# Patient Record
Sex: FEMALE | Race: WHITE | NOT HISPANIC OR LATINO | Employment: PART TIME | URBAN - METROPOLITAN AREA
[De-identification: names, ages, dates, MRNs, and addresses within clinical notes are randomized per-mention and may not be internally consistent; named-entity substitution may affect disease eponyms.]

---

## 2020-08-04 ENCOUNTER — TRANSFERRED RECORDS (OUTPATIENT)
Dept: HEALTH INFORMATION MANAGEMENT | Facility: CLINIC | Age: 65
End: 2020-08-04

## 2020-08-27 ENCOUNTER — HOSPITAL ENCOUNTER (OUTPATIENT)
Dept: MRI IMAGING | Facility: CLINIC | Age: 65
Discharge: HOME | End: 2020-08-27
Attending: PHYSICIAN ASSISTANT | Admitting: PHYSICIAN ASSISTANT
Payer: COMMERCIAL

## 2020-08-27 DIAGNOSIS — M47.812 CERVICAL SPONDYLOSIS WITHOUT MYELOPATHY: ICD-10-CM

## 2020-08-27 PROCEDURE — 72141 MRI NECK SPINE W/O DYE: CPT

## 2020-10-29 ENCOUNTER — TELEPHONE (OUTPATIENT)
Dept: NEUROSURGERY | Facility: CLINIC | Age: 65
End: 2020-10-29

## 2020-10-29 NOTE — TELEPHONE ENCOUNTER
Left  for pt to return call. Referral scanned into chart.    Appt note:    Ref by Oksana, to evaluate and treat neck pain. MRI w/[facility]. prev neck surg w/[facility], No recent INJ.

## 2020-12-03 ENCOUNTER — OFFICE VISIT (OUTPATIENT)
Dept: NEUROSURGERY | Facility: CLINIC | Age: 65
End: 2020-12-03
Payer: COMMERCIAL

## 2020-12-03 VITALS
DIASTOLIC BLOOD PRESSURE: 72 MMHG | BODY MASS INDEX: 27.11 KG/M2 | WEIGHT: 153 LBS | HEART RATE: 93 BPM | TEMPERATURE: 97.9 F | OXYGEN SATURATION: 98 % | HEIGHT: 63 IN | SYSTOLIC BLOOD PRESSURE: 132 MMHG

## 2020-12-03 DIAGNOSIS — M48.02 SPINAL STENOSIS IN CERVICAL REGION: Primary | ICD-10-CM

## 2020-12-03 PROCEDURE — 99203 OFFICE O/P NEW LOW 30 MIN: CPT | Performed by: PHYSICIAN ASSISTANT

## 2020-12-03 RX ORDER — HYDROCODONE BITARTRATE AND ACETAMINOPHEN 5; 325 MG/1; MG/1
1 TABLET ORAL EVERY 6 HOURS PRN
COMMUNITY

## 2020-12-03 RX ORDER — FENTANYL 12.5 UG/1
1 PATCH TRANSDERMAL
COMMUNITY

## 2020-12-03 RX ORDER — BUPROPION HYDROCHLORIDE 100 MG/1
100 TABLET, EXTENDED RELEASE ORAL 2 TIMES DAILY
COMMUNITY

## 2020-12-03 RX ORDER — CYCLOBENZAPRINE HCL 5 MG
5 TABLET ORAL 3 TIMES DAILY PRN
COMMUNITY

## 2020-12-03 RX ORDER — CELECOXIB 50 MG/1
50 CAPSULE ORAL 2 TIMES DAILY
COMMUNITY

## 2020-12-03 ASSESSMENT — MIFFLIN-ST. JEOR: SCORE: 1195.19

## 2020-12-03 ASSESSMENT — PAIN SCALES - GENERAL: PAINLEVEL: MODERATE PAIN (5)

## 2020-12-03 NOTE — PROGRESS NOTES
Dr. Toby Perez  [facility]  Neurosurgery Clinic Visit      CC: neck pain    Primary care Provider: No Ref-Primary, Physician    Referring Provider:  Skip Adamson PA-C      Reason For Visit:   I was asked to consult on the patient for neck pain.      HPI: Brittnee Salazar is a 64 year old female who presents for evaluation of her chief complaint of neck pain, gradually worsening over the years.  She had a cervical fusion done at C4-5 about 10 years ago.  This was done with Dr. Clark in [place].  She has been following with [facility], managed on oral pain medications over the last couple of years.  She then was sent for a cervical spine MRI, which did show moderate to severe central stenosis at C3-4, the level superior to her prior fusion.  She was then referred to our office.  She does state that her neck pain seems to be fairly mild.  She is not describing any radicular type arm pain or paresthesias.  She does note that she has dropped things over the last few months, but has not noticed any particular weakness or discoordination in her upper extremities.  She is not showing any hyperreflexia or Nya sign on exam.  She denies any bowel or bladder changes, or problems with balance or coordination.      Past Medical History reviewed with patient during visit.    Past Surgical History reviewed with patient during visit.    Current Outpatient Medications   Medication     buPROPion (WELLBUTRIN SR) 100 MG 12 hr tablet     celecoxib (CELEBREX) 50 MG capsule     cyclobenzaprine (FLEXERIL) 5 MG tablet     fentaNYL (DURAGESIC) 12 mcg/hr 72 hr patch     HYDROcodone-acetaminophen (NORCO) 5-325 MG tablet     No current facility-administered medications for this visit.        No Known Allergies    Social History     Socioeconomic History     Marital status:      Spouse name: None     Number of children: None     Years of education: None     Highest education level: None   Occupational  "History     None   Social Needs     Financial resource strain: None     Food insecurity     Worry: None     Inability: None     Transportation needs     Medical: None     Non-medical: None   Tobacco Use     Smoking status: Current Every Day Smoker     Smokeless tobacco: Never Used   Substance and Sexual Activity     Alcohol use: None     Drug use: None     Sexual activity: None   Lifestyle     Physical activity     Days per week: None     Minutes per session: None     Stress: None   Relationships     Social connections     Talks on phone: None     Gets together: None     Attends Judaism service: None     Active member of club or organization: None     Attends meetings of clubs or organizations: None     Relationship status: None     Intimate partner violence     Fear of current or ex partner: None     Emotionally abused: None     Physically abused: None     Forced sexual activity: None   Other Topics Concern     None   Social History Narrative     None       No family history on file.       ROS: 10 point ROS neg other than the symptoms noted above in the HPI.    Vital Signs: /72   Pulse 93   Temp 97.9  F (36.6  C) (Temporal)   Ht 5' 2.5\" (1.588 m)   Wt 153 lb (69.4 kg)   SpO2 98%   BMI 27.54 kg/m      Examination:  Constitutional:  Alert, well nourished, NAD.  HEENT: Normocephalic, atraumatic.   Pulmonary:  Without shortness of breath, normal effort.   Lymph: no lymphadenopathy to low back or LE.   Integumentary: Skin is free of rashes or lesions.   Cardiovascular:  No pitting edema of BLE.    Psych: Normal affect, no apparent distress    Neurological:  Awake  Alert  Oriented x 3  Speech clear  Cranial nerves II - XII grossly intact  Motor exam   Shoulder Abduction:  Right:  5/5   Left:  5/5  Biceps:                      Right:  5/5   Left:  5/5  Triceps:                     Right:  5/5   Left:  5/5  Wrist Extensors:       Right:  5/5   Left:  5/5  Wrist Flexors:           Right:  5/5   Left:  " 5/5  Sensation normal to bilateral upper and lower extremities.    Reflexes are 2+ in the patellar and Achilles. There is no clonus. Downgoing Babinski.    Reflexes are 2+ in the brachial radialis and triceps. Negative Nya sign bilaterally.    Musculoskeletal:  Gait: Able to stand from a seated position. Normal non-antalgic, non-myelopathic gait.    Cervical examination reveals good range of motion.  No tenderness to palpation of the cervical spine or paraspinous muscles bilaterally.    Imaging:   MRI of the cervical spine was reviewed in the office today.   1. Postoperative change of C4-C5 anterior fusion.  2. Severe degenerative change as detailed, worst at C3-C4, C5-C6, and  C7-T1.  3. Severe spinal canal stenosis at C3-C4.  4. Severe foraminal stenosis at multiple levels.    Assessment/Plan:     Cervical stenosis C3-4  Prior C4-5 ACDF    Brittnee Salazar is a 64 year old female.  I did have a discussion with the patient regarding her symptoms and her MRI findings, which we reviewed in detail.  She understands that the level adjacent to her prior fusion has degenerated quite a bit, and has now contributed to severe central stenosis at that level.  There is not appear to be any definite cord hyperintensity signaling. Negative Metcalf, UE strength intact. No hyperreflexia noted. However, I did recommend to her that she follow-up with Dr. Perez, especially if she develops any worsening neurological symptoms, although she is not currently experiencing any.  She voiced agreement and understanding.          Homer Franco PA-C  [facility] Neurosurgery  [address] [address]    Tel [phone]  Pager [phone]

## 2020-12-03 NOTE — LETTER
"    12/3/2020         RE: Brittnee Salazar  [address] [facility] [address]        Dear Colleague,    Thank you for referring your patient, Brittnee Salazar, to the [facility] [place]. Please see a copy of my visit note below.    Brittnee Salazar is a 64 year old female who presents for:  Chief Complaint   Patient presents with     Neurologic Problem     Neck pain         Initial Vitals:  /72   Pulse 93   Temp 97.9  F (36.6  C) (Temporal)   Ht 5' 2.5\" (1.588 m)   Wt 153 lb (69.4 kg)   SpO2 98%   BMI 27.54 kg/m   Estimated body mass index is 27.54 kg/m  as calculated from the following:    Height as of this encounter: 5' 2.5\" (1.588 m).    Weight as of this encounter: 153 lb (69.4 kg).. Body surface area is 1.75 meters squared. BP completed using cuff size: regular  Moderate Pain (5)    Nursing Comments:     Supriya Perez  [facility]  Neurosurgery Clinic Visit      CC: neck pain    Primary care Provider: No Ref-Primary, Physician    Referring Provider:  Skip Adamson PA-C      Reason For Visit:   I was asked to consult on the patient for neck pain.      HPI: Brittnee Salazar is a 64 year old female who presents for evaluation of her chief complaint of neck pain, gradually worsening over the years.  She had a cervical fusion done at C4-5 about 10 years ago.  This was done with Dr. Clark in [place].  She has been following with [facility], managed on oral pain medications over the last couple of years.  She then was sent for a cervical spine MRI, which did show moderate to severe central stenosis at C3-4, the level superior to her prior fusion.  She was then referred to our office.  She does state that her neck pain seems to be fairly mild.  She is not describing any radicular type arm pain or paresthesias.  She does note that she has dropped things over the last few months, but has not noticed any particular weakness or " discoordination in her upper extremities.  She is not showing any hyperreflexia or Nya sign on exam.  She denies any bowel or bladder changes, or problems with balance or coordination.      Past Medical History reviewed with patient during visit.    Past Surgical History reviewed with patient during visit.    Current Outpatient Medications   Medication     buPROPion (WELLBUTRIN SR) 100 MG 12 hr tablet     celecoxib (CELEBREX) 50 MG capsule     cyclobenzaprine (FLEXERIL) 5 MG tablet     fentaNYL (DURAGESIC) 12 mcg/hr 72 hr patch     HYDROcodone-acetaminophen (NORCO) 5-325 MG tablet     No current facility-administered medications for this visit.        No Known Allergies    Social History     Socioeconomic History     Marital status:      Spouse name: None     Number of children: None     Years of education: None     Highest education level: None   Occupational History     None   Social Needs     Financial resource strain: None     Food insecurity     Worry: None     Inability: None     Transportation needs     Medical: None     Non-medical: None   Tobacco Use     Smoking status: Current Every Day Smoker     Smokeless tobacco: Never Used   Substance and Sexual Activity     Alcohol use: None     Drug use: None     Sexual activity: None   Lifestyle     Physical activity     Days per week: None     Minutes per session: None     Stress: None   Relationships     Social connections     Talks on phone: None     Gets together: None     Attends Confucianism service: None     Active member of club or organization: None     Attends meetings of clubs or organizations: None     Relationship status: None     Intimate partner violence     Fear of current or ex partner: None     Emotionally abused: None     Physically abused: None     Forced sexual activity: None   Other Topics Concern     None   Social History Narrative     None       No family history on file.       ROS: 10 point ROS neg other than the symptoms noted  "above in the HPI.    Vital Signs: /72   Pulse 93   Temp 97.9  F (36.6  C) (Temporal)   Ht 5' 2.5\" (1.588 m)   Wt 153 lb (69.4 kg)   SpO2 98%   BMI 27.54 kg/m      Examination:  Constitutional:  Alert, well nourished, NAD.  HEENT: Normocephalic, atraumatic.   Pulmonary:  Without shortness of breath, normal effort.   Lymph: no lymphadenopathy to low back or LE.   Integumentary: Skin is free of rashes or lesions.   Cardiovascular:  No pitting edema of BLE.    Psych: Normal affect, no apparent distress    Neurological:  Awake  Alert  Oriented x 3  Speech clear  Cranial nerves II - XII grossly intact  Motor exam   Shoulder Abduction:  Right:  5/5   Left:  5/5  Biceps:                      Right:  5/5   Left:  5/5  Triceps:                     Right:  5/5   Left:  5/5  Wrist Extensors:       Right:  5/5   Left:  5/5  Wrist Flexors:           Right:  5/5   Left:  5/5  Sensation normal to bilateral upper and lower extremities.    Reflexes are 2+ in the patellar and Achilles. There is no clonus. Downgoing Babinski.    Reflexes are 2+ in the brachial radialis and triceps. Negative Nya sign bilaterally.    Musculoskeletal:  Gait: Able to stand from a seated position. Normal non-antalgic, non-myelopathic gait.    Cervical examination reveals good range of motion.  No tenderness to palpation of the cervical spine or paraspinous muscles bilaterally.    Imaging:   MRI of the cervical spine was reviewed in the office today.   1. Postoperative change of C4-C5 anterior fusion.  2. Severe degenerative change as detailed, worst at C3-C4, C5-C6, and  C7-T1.  3. Severe spinal canal stenosis at C3-C4.  4. Severe foraminal stenosis at multiple levels.    Assessment/Plan:     Cervical stenosis C3-4  Prior C4-5 ACDF    Brittnee Salazar is a 64 year old female.  I did have a discussion with the patient regarding her symptoms and her MRI findings, which we reviewed in detail.  She understands that the level adjacent to her " prior fusion has degenerated quite a bit, and has now contributed to severe central stenosis at that level.  There is not appear to be any definite cord hyperintensity signaling. Negative Metcalf, UE strength intact. No hyperreflexia noted. However, I did recommend to her that she follow-up with Dr. Perez, especially if she develops any worsening neurological symptoms, although she is not currently experiencing any.  She voiced agreement and understanding.          Homer Franco PA-C  [facility] [place] [address]    Tel [phone]  Pager [phone]        Again, thank you for allowing me to participate in the care of your patient.        Sincerely,        Homer Franco PA-C

## 2020-12-03 NOTE — PROGRESS NOTES
"Brittnee Salazar is a 64 year old female who presents for:  Chief Complaint   Patient presents with     Neurologic Problem     Neck pain         Initial Vitals:  /72   Pulse 93   Temp 97.9  F (36.6  C) (Temporal)   Ht 5' 2.5\" (1.588 m)   Wt 153 lb (69.4 kg)   SpO2 98%   BMI 27.54 kg/m   Estimated body mass index is 27.54 kg/m  as calculated from the following:    Height as of this encounter: 5' 2.5\" (1.588 m).    Weight as of this encounter: 153 lb (69.4 kg).. Body surface area is 1.75 meters squared. BP completed using cuff size: regular  Moderate Pain (5)    Nursing Comments:     Supriya Limon    "

## 2022-11-20 ENCOUNTER — HOSPITAL ENCOUNTER (EMERGENCY)
Facility: CLINIC | Age: 67
Discharge: HOME | End: 2022-11-20
Attending: EMERGENCY MEDICINE | Admitting: EMERGENCY MEDICINE
Payer: COMMERCIAL

## 2022-11-20 VITALS
RESPIRATION RATE: 16 BRPM | DIASTOLIC BLOOD PRESSURE: 92 MMHG | WEIGHT: 138 LBS | HEIGHT: 62 IN | TEMPERATURE: 98.6 F | SYSTOLIC BLOOD PRESSURE: 161 MMHG | HEART RATE: 104 BPM | OXYGEN SATURATION: 99 % | BODY MASS INDEX: 25.4 KG/M2

## 2022-11-20 DIAGNOSIS — N39.0 URINARY TRACT INFECTION IN FEMALE: ICD-10-CM

## 2022-11-20 DIAGNOSIS — M54.50 BILATERAL LOW BACK PAIN WITHOUT SCIATICA, UNSPECIFIED CHRONICITY: ICD-10-CM

## 2022-11-20 LAB
ALBUMIN UR-MCNC: NEGATIVE MG/DL
APPEARANCE UR: CLEAR
BACTERIA #/AREA URNS HPF: ABNORMAL /HPF
BILIRUB UR QL STRIP: NEGATIVE
COLOR UR AUTO: YELLOW
GLUCOSE UR STRIP-MCNC: NEGATIVE MG/DL
HGB UR QL STRIP: ABNORMAL
HYALINE CASTS: 1 /LPF
KETONES UR STRIP-MCNC: NEGATIVE MG/DL
LEUKOCYTE ESTERASE UR QL STRIP: ABNORMAL
MUCOUS THREADS #/AREA URNS LPF: PRESENT /LPF
NITRATE UR QL: POSITIVE
PH UR STRIP: 6 [PH] (ref 5–7)
RBC URINE: 4 /HPF
SP GR UR STRIP: >=1.03 (ref 1–1.03)
SQUAMOUS EPITHELIAL: 1 /HPF
UROBILINOGEN UR STRIP-MCNC: NORMAL MG/DL
WBC URINE: 13 /HPF

## 2022-11-20 PROCEDURE — 99282 EMERGENCY DEPT VISIT SF MDM: CPT | Performed by: EMERGENCY MEDICINE

## 2022-11-20 PROCEDURE — 87086 URINE CULTURE/COLONY COUNT: CPT | Performed by: EMERGENCY MEDICINE

## 2022-11-20 PROCEDURE — 99284 EMERGENCY DEPT VISIT MOD MDM: CPT

## 2022-11-20 PROCEDURE — 81001 URINALYSIS AUTO W/SCOPE: CPT | Performed by: EMERGENCY MEDICINE

## 2022-11-20 RX ORDER — METHYLPREDNISOLONE 4 MG
TABLET, DOSE PACK ORAL
Qty: 21 TABLET | Refills: 0 | Status: SHIPPED | OUTPATIENT
Start: 2022-11-20

## 2022-11-20 RX ORDER — CIPROFLOXACIN 500 MG/1
500 TABLET, FILM COATED ORAL 2 TIMES DAILY
Qty: 14 TABLET | Refills: 0 | Status: SHIPPED | OUTPATIENT
Start: 2022-11-20 | End: 2022-11-27

## 2022-11-20 ASSESSMENT — ACTIVITIES OF DAILY LIVING (ADL): ADLS_ACUITY_SCORE: 35

## 2022-11-20 NOTE — ED TRIAGE NOTES
Pt comes in with complaints of lower back pain. Pt requesting urinalysis as she believes it could be a UTI.     Triage Assessment     Row Name 11/20/22 1034       Triage Assessment (Adult)    Airway WDL WDL       Respiratory WDL    Respiratory WDL WDL       Skin Circulation/Temperature WDL    Skin Circulation/Temperature WDL WDL       Cardiac WDL    Cardiac WDL WDL       Peripheral/Neurovascular WDL    Peripheral Neurovascular WDL WDL       Cognitive/Neuro/Behavioral WDL    Cognitive/Neuro/Behavioral WDL WDL

## 2022-11-20 NOTE — DISCHARGE INSTRUCTIONS
-The urine has some early signs for infection.  Urine culture is being processed.  Recommend starting ciprofloxacin 5 mg twice daily for 7 days.    -With your prior low back problems and surgery and having a lot of mechanical low back pain called in a prescription to the pharmacy for Medrol Dosepak.  If treating the urinary tract infection does not resolve your low back pain -recommend starting a Medrol Dosepak.

## 2022-11-20 NOTE — ED PROVIDER NOTES
"  History     Chief Complaint   Patient presents with     Rule out Urinary Tract Infection     HPI  Brittnee Salazar is a 66 year old female who presents concerns for possible urinary tract infection.  Earlier this week she rotated in the car felt pain in her low back.  She has had previous 4 level spinal fusion.  Since that time she has been having mechanical low back pain with movement such as prolonged sitting bending lifting reaching and twisting.  She has had some subtle UTI symptoms of little bit of dysuria but no fever or hematuria  Low back pain is not associate with any change in bladder or bowel function, saddle anesthesia or referred pain into either lower extremity.    Allergies:  No Known Allergies    Problem List:    There are no problems to display for this patient.       Past Medical History:    History reviewed. No pertinent past medical history.    Past Surgical History:    History reviewed. No pertinent surgical history.    Family History:    History reviewed. No pertinent family history.    Social History:  Marital Status:   [2]  Social History     Tobacco Use     Smoking status: Every Day     Packs/day: 0.50     Types: Cigarettes     Smokeless tobacco: Never   Substance Use Topics     Alcohol use: Not Currently     Drug use: Never        Medications:    buPROPion (WELLBUTRIN SR) 100 MG 12 hr tablet  celecoxib (CELEBREX) 50 MG capsule  cyclobenzaprine (FLEXERIL) 5 MG tablet  fentaNYL (DURAGESIC) 12 mcg/hr 72 hr patch  HYDROcodone-acetaminophen (NORCO) 5-325 MG tablet          Review of Systems   All other systems reviewed and are negative.      Physical Exam   BP: (!) 161/92  Pulse: 104  Temp: 98.6  F (37  C)  Resp: 16  Height: 157.5 cm (5' 2\")  Weight: 62.6 kg (138 lb)  SpO2: 99 %      Physical Exam  Vitals and nursing note reviewed.   Constitutional:       Appearance: She is not ill-appearing.   HENT:      Head: Normocephalic.      Nose: Nose normal.   Eyes:      Conjunctiva/sclera: " Conjunctivae normal.   Cardiovascular:      Rate and Rhythm: Normal rate.   Pulmonary:      Effort: Pulmonary effort is normal.   Musculoskeletal:      Comments: Lumbar spine:  Large old midline incision  With loss of lordotic curve due to previous fusion  No long track signs   Skin:     General: Skin is warm.      Capillary Refill: Capillary refill takes less than 2 seconds.      Findings: No rash.   Neurological:      General: No focal deficit present.      Mental Status: She is alert and oriented to person, place, and time.   Psychiatric:         Mood and Affect: Mood normal.         Behavior: Behavior normal.         ED Course                 Procedures                  Results for orders placed or performed during the hospital encounter of 11/20/22 (from the past 24 hour(s))   UA with Microscopic reflex to Culture    Specimen: Urine, Midstream   Result Value Ref Range    Color Urine Yellow Colorless, Straw, Light Yellow, Yellow    Appearance Urine Clear Clear    Glucose Urine Negative Negative mg/dL    Bilirubin Urine Negative Negative    Ketones Urine Negative Negative mg/dL    Specific Gravity Urine >=1.030 1.003 - 1.035    Blood Urine Small (A) Negative    pH Urine 6.0 5.0 - 7.0    Protein Albumin Urine Negative Negative mg/dL    Urobilinogen Urine Normal Normal, 2.0 mg/dL    Nitrite Urine Positive (A) Negative    Leukocyte Esterase Urine Small (A) Negative    Bacteria Urine Few (A) None Seen /HPF    Mucus Urine Present (A) None Seen /LPF    RBC Urine 4 (H) <=2 /HPF    WBC Urine 13 (H) <=5 /HPF    Squamous Epithelials Urine 1 <=1 /HPF    Hyaline Casts Urine 1 <=2 /LPF    Narrative    Urine Culture ordered based on laboratory criteria       Medications - No data to display    Assessments & Plan (with Medical Decision Making)  Acute on chronic low back pain.  To treat the back which she rotated in the car.  Since this occurrence over the past week mechanically she been having increased pain but fortunately no  saddle anesthesia changes bladder bowel dysfunction or long tract radicular symptoms.  She also was concerned about some mild urinary tract symptoms.  Screened urine and did note that she had 13 white cells in her urine with positive nitrates and a small leukocyte Estrace I am concerned about UTI.  Urine culture pending.  Treated with ciprofloxacin 500 twice daily x7 days.  If this does not resolve her low back discomfort then also provided a prescription for Medrol Dosepak.     I have reviewed the nursing notes.    I have reviewed the findings, diagnosis, plan and need for follow up with the patient.      New Prescriptions    No medications on file       Final diagnoses:   Urinary tract infection in female   Bilateral low back pain without sciatica, unspecified chronicity       11/20/2022   [facility] EMERGENCY DEPT     Nilson Peña, DO  11/20/22 1137

## 2022-11-21 LAB — BACTERIA UR CULT: NORMAL

## 2022-11-21 NOTE — RESULT ENCOUNTER NOTE
Final urine culture report is negative.  Adult Negative Urine culture parameters per protocol: Any # Urogenital single or mixed organism, <10,000 col/ml single organism (cath/midstream), and > 3 organisms (No susceptibilities performed).  [facility] Emergency Dept discharge antibiotic prescribed (If applicable): Ciprofloxacin  Treatment recommendations per [facility] ED Lab Result Urine Culture protocol.

## 2023-01-29 ENCOUNTER — HEALTH MAINTENANCE LETTER (OUTPATIENT)
Age: 68
End: 2023-01-29

## 2024-02-25 ENCOUNTER — HEALTH MAINTENANCE LETTER (OUTPATIENT)
Age: 69
End: 2024-02-25

## 2025-02-15 ENCOUNTER — HEALTH MAINTENANCE LETTER (OUTPATIENT)
Age: 70
End: 2025-02-15

## 2025-03-15 ENCOUNTER — HEALTH MAINTENANCE LETTER (OUTPATIENT)
Age: 70
End: 2025-03-15